# Patient Record
Sex: FEMALE | Race: WHITE | NOT HISPANIC OR LATINO | URBAN - METROPOLITAN AREA
[De-identification: names, ages, dates, MRNs, and addresses within clinical notes are randomized per-mention and may not be internally consistent; named-entity substitution may affect disease eponyms.]

---

## 2021-08-09 ENCOUNTER — OFFICE (OUTPATIENT)
Dept: URBAN - METROPOLITAN AREA CLINIC 84 | Facility: CLINIC | Age: 76
End: 2021-08-09
Payer: OTHER GOVERNMENT

## 2021-08-09 VITALS
HEART RATE: 92 BPM | RESPIRATION RATE: 12 BRPM | SYSTOLIC BLOOD PRESSURE: 126 MMHG | DIASTOLIC BLOOD PRESSURE: 82 MMHG | WEIGHT: 197 LBS | HEIGHT: 68 IN

## 2021-08-09 DIAGNOSIS — R13.10 DYSPHAGIA, UNSPECIFIED: ICD-10-CM

## 2021-08-09 DIAGNOSIS — K44.9 DIAPHRAGMATIC HERNIA WITHOUT OBSTRUCTION OR GANGRENE: ICD-10-CM

## 2021-08-09 DIAGNOSIS — K22.4 DYSKINESIA OF ESOPHAGUS: ICD-10-CM

## 2021-08-09 PROCEDURE — 99213 OFFICE O/P EST LOW 20 MIN: CPT | Performed by: INTERNAL MEDICINE

## 2021-08-09 NOTE — SERVICENOTES
I had a long discussion with the patient regarding her symptoms and recent esophagram results. She does not want to undergo another EGD - I discussed with her that I suspect her ongoing symptoms are due to esophageal dysmotility rather than a mechanical issues (e.g. Schatzki's ring) - especially given the lack of any improvement with previous esophageal dilation and that the barium tablet passed promptly into the stomach without delay. 
We discussed lifestyle modifications she can do to try an minimize her symptoms (e.g. avoiding those foods that reliably cause problems, drinking liquids when she eats to help propel food/tablets into her stomach, etc). We discussed doing a trial of SL Levsin to see if that would help but she declined.
For now, I will have her return to see me as needed.

## 2021-08-09 NOTE — SERVICEHPINOTES
The patient is seen today for follow-up regarding a history of dysphagia.She underwent an EGD on 4/16/18, secondary to dysphagia, which was remarkable for a small hiatal hernia and associated moderate Schatzki's ring which was dilated with a TTS balloon up to 17mm. Esophageal biopsies were also taken but were without evidence of EoE.brA colonoscopy was done at the same time (polyp surveillance) and was remarkable for a mix of several adenomatous and hyperplastic polyps - her exam was also remarkable for sigmoid diverticulosis and mild internal hemorrhoids. A repeat exam for surveillance was not recommended due to her age.
br Today, she reports ongoing issues with intermittent dysphagia for solids and medication tablets that she localizes to her upper/mid esophagus. She reports having issues with dysphagia for 10+ years and endorses occasional need for regurgitation. She reports that her previous EGD with dilation did not improve her dysphagia.brShe denies any GI tract bleeding symptoms, unexplained weight loss or heartburn/reflux symptoms - a barium esophagram done through Dr. Antonio' office on 7/27/21 demonstrate tertiary contractions, small hiatal hernia with nonstenotic B ring and reflux. The 13mm barium tablet passed promptly into the stomach.